# Patient Record
(demographics unavailable — no encounter records)

---

## 2024-10-14 NOTE — ASSESSMENT
[FreeTextEntry1] : - removed fiberglass short arm thumb spica cast.  - Recommend thumb spica brace, PRN comfort. wean off daytime use after 2 weeks. Fit for and provided brace in office today.  - prescribed OT for R hand. re-demonstrated HEP for digital flexion/extension.  - elevate hand/wrist above level of heart as much as possible to reduce swelling. - gradually advance activity as pain allows. - Work: continue OOW, anticipate 3 months from DOI.  F/u 4 weeks. X-rays R wrist + thumb.
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
## 2024-11-11 NOTE — ASSESSMENT
[FreeTextEntry1] : - d/c thumb spica brace. - renewed OT for R hand. reiterated importance HEP at least 3x/day. WBAT. - advance activity as pain allows. - Work: continue OOW, will re-evaluate at next visit.  F/u 4-6 weeks.

## 2024-11-11 NOTE — IMAGING
[de-identified] : RIGHT HAND skin intact. mild swelling of wrist and thumb. TTP to wrist and thumb. wrist ROM: limited extension, flexion. good pronation, mild limited supination. good EPL, FPL. fingers good extension, flex to full fist. no scissoring. good finger abduction, adduction.  SILT to median, ulnar, radial distribution.  brisk cap refill all digits. no triggering.   XRAYS OF RIGHT THUMB (3 views - PA, OBLIQUE, AND LATERAL VIEWS): stable position/alignment with interval healing of proximal phalanx shaft/base fx. XRAYS OF RIGHT WRIST (3 views - PA, OBLIQUE, AND LATERAL VIEWS): stable position/alignment with interval healing of distal radius intra-articular fx, thumb proximal phalanx fx. deformity of 5th metacarpal base.
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
## 2024-12-18 NOTE — IMAGING
[de-identified] : RIGHT HAND skin intact. mild swelling of wrist and thumb. mild TTP to thumb > wrist. wrist ROM: limited extension, flexion. good pronation, mild limited supination. good EPL, FPL. fingers good extension, flex to full fist. no scissoring. good finger abduction, adduction.  SILT to median, ulnar, radial distribution.  brisk cap refill all digits. no triggering.   @11/11/24 XRAYS OF RIGHT THUMB (3 views - PA, OBLIQUE, AND LATERAL VIEWS): stable position/alignment with interval healing of proximal phalanx shaft/base fx. @11/11/24 XRAYS OF RIGHT WRIST (3 views - PA, OBLIQUE, AND LATERAL VIEWS): stable position/alignment with interval healing of distal radius intra-articular fx, thumb proximal phalanx fx. deformity of 5th metacarpal base.

## 2024-12-18 NOTE — ASSESSMENT
[FreeTextEntry1] : - renewed OT for R hand. WBAT. - advance activity as pain allows. - Work: continue OOW, will re-evaluate at next visit.  Patient reports work not able to accommodate light duty restrictions.  F/u 6-8 weeks. X-rays of R wrist and R thumb.

## 2025-02-28 NOTE — IMAGING
[de-identified] : RIGHT HAND skin intact. mild swelling of wrist and thumb. min TTP to MPJ. min TTP to distal radius. wrist ROM: limited extension, flexion. good pronation, supination. good EPL, FPL. thumb opposition almost to base of SF. + mild thumb MPJ pain at end opposition. fingers good extension, flex to full fist. no scissoring. good finger abduction, adduction.  SILT to median, ulnar, radial distribution.  brisk cap refill all digits. no triggering.  DRUJ shear => min pain @supination. no instability.  @2/27/25 XRAYS OF RIGHT THUMB (3 views - PA, OBLIQUE, AND LATERAL VIEWS): no acute displaced fracture or dislocation. healed proximal phalanx shaft/base fx. @2/27/25 XRAYS OF RIGHT WRIST (3 views - PA, OBLIQUE, AND LATERAL VIEWS): no acute displaced fracture or dislocation. healed distal radius intra-articular fx. deformity of 5th metacarpal base.
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
## 2025-02-28 NOTE — ASSESSMENT
[FreeTextEntry1] : - activity as tolerated. - Work: patient does not feel comfortable returning to work due to pain. continue OOW, discussed plan to return to work after next visit.  Patient reports work is not able to accommodate light duty restrictions.  F/u 6 weeks.  2/28/25 ADDENDUM Patient was attending OT @[facility] in [place] - Reached out to therapist Karis Vargas. Patient's therapist reports that last session was 1/20/25 due to notice that NF denied claim. Patient was independent with RUE activities of daily living.  strength 80#, Pinch strength 14#.

## 2025-02-28 NOTE — ASSESSMENT
[FreeTextEntry1] : - activity as tolerated. - Work: patient does not feel comfortable returning to work due to pain. continue OOW, discussed plan to return to work after next visit.  Patient reports work is not able to accommodate light duty restrictions.  F/u 6 weeks.  2/28/25 ADDENDUM Patient was attending OT @[facility] in [place] - Reached out to therapist Karis Vargas. Patient's therapist reports that last session was 1/20/25 due to notice that NF denied claim. Patient was independent with RUE activities of daily living.  strength 80#, Pinch strength 14#.
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
## 2025-02-28 NOTE — IMAGING
[de-identified] : RIGHT HAND skin intact. mild swelling of wrist and thumb. min TTP to MPJ. min TTP to distal radius. wrist ROM: limited extension, flexion. good pronation, supination. good EPL, FPL. thumb opposition almost to base of SF. + mild thumb MPJ pain at end opposition. fingers good extension, flex to full fist. no scissoring. good finger abduction, adduction.  SILT to median, ulnar, radial distribution.  brisk cap refill all digits. no triggering.  DRUJ shear => min pain @supination. no instability.  @2/27/25 XRAYS OF RIGHT THUMB (3 views - PA, OBLIQUE, AND LATERAL VIEWS): no acute displaced fracture or dislocation. healed proximal phalanx shaft/base fx. @2/27/25 XRAYS OF RIGHT WRIST (3 views - PA, OBLIQUE, AND LATERAL VIEWS): no acute displaced fracture or dislocation. healed distal radius intra-articular fx. deformity of 5th metacarpal base.

## 2025-04-10 NOTE — ASSESSMENT
[FreeTextEntry1] : NEW RIGHT hand stiffness without new injury - possible tenosynovitis vs CTS.  Also has RIGHT shoulder pain and stiffness of unclear chronicity.   - MRI of R wrist to evaluate for tendon tenosynovitis vs tear. - continue HEP. - pain guided activity modification. - Work: patient does not feel comfortable returning to work due to pain. continue OOW, will re-evaluate at next visit. Patient reports work is not able to accommodate light duty restrictions.  F/u with me after MRI. F/u with shoulder specialist for R shoulder.
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
## 2025-04-10 NOTE — IMAGING
[de-identified] : RIGHT HAND skin intact. mild swelling of wrist. mild swelling of thumb and SF. TTP diffusely to wrist and thumb. wrist ROM: limited extension, flexion. good pronation, supination. good EPL, FPL. thumb opposition almost to base of SF. SF PIPJ mild flexion contracture, otherwise fingers good extension. asynchronous finger flexion of IPJ then MPJ. composite flexion 2-3cm to DPC actively, flexion to DPC passively.  + dorsal wrist/forearm pain with finger flexion.  SILT to median, ulnar, radial distribution.  brisk cap refill all digits. no triggering.  DRUJ shear => + pain. no instability.  @2/27/25 XRAYS OF RIGHT THUMB (3 views - PA, OBLIQUE, AND LATERAL VIEWS): no acute displaced fracture or dislocation. healed proximal phalanx shaft/base fx. @2/27/25 XRAYS OF RIGHT WRIST (3 views - PA, OBLIQUE, AND LATERAL VIEWS): no acute displaced fracture or dislocation. healed distal radius intra-articular fx. deformity of 5th metacarpal base.